# Patient Record
Sex: MALE | Race: OTHER
[De-identification: names, ages, dates, MRNs, and addresses within clinical notes are randomized per-mention and may not be internally consistent; named-entity substitution may affect disease eponyms.]

---

## 2020-06-15 ENCOUNTER — HOSPITAL ENCOUNTER (INPATIENT)
Dept: HOSPITAL 54 - ER | Age: 45
LOS: 8 days | Discharge: HOME | DRG: 133 | End: 2020-06-23
Attending: INTERNAL MEDICINE | Admitting: INTERNAL MEDICINE
Payer: COMMERCIAL

## 2020-06-15 VITALS — BODY MASS INDEX: 38.24 KG/M2 | HEIGHT: 64 IN | WEIGHT: 224 LBS

## 2020-06-15 DIAGNOSIS — E44.1: ICD-10-CM

## 2020-06-15 DIAGNOSIS — J15.9: ICD-10-CM

## 2020-06-15 DIAGNOSIS — J96.01: Primary | ICD-10-CM

## 2020-06-15 DIAGNOSIS — J12.89: ICD-10-CM

## 2020-06-15 DIAGNOSIS — R73.9: ICD-10-CM

## 2020-06-15 DIAGNOSIS — E86.1: ICD-10-CM

## 2020-06-15 DIAGNOSIS — E87.1: ICD-10-CM

## 2020-06-15 DIAGNOSIS — U07.1: ICD-10-CM

## 2020-06-15 LAB
BASOPHILS # BLD AUTO: 0 /CMM (ref 0–0.2)
BASOPHILS NFR BLD AUTO: 0.5 % (ref 0–2)
EOSINOPHIL NFR BLD AUTO: 2.2 % (ref 0–6)
HCT VFR BLD AUTO: 45 % (ref 39–51)
HGB BLD-MCNC: 15.1 G/DL (ref 13.5–17.5)
LYMPHOCYTES NFR BLD AUTO: 1 /CMM (ref 0.8–4.8)
LYMPHOCYTES NFR BLD AUTO: 22.7 % (ref 20–44)
MCHC RBC AUTO-ENTMCNC: 34 G/DL (ref 31–36)
MCV RBC AUTO: 89 FL (ref 80–96)
MONOCYTES NFR BLD AUTO: 0.5 /CMM (ref 0.1–1.3)
MONOCYTES NFR BLD AUTO: 11.9 % (ref 2–12)
NEUTROPHILS # BLD AUTO: 2.7 /CMM (ref 1.8–8.9)
NEUTROPHILS NFR BLD AUTO: 62.7 % (ref 43–81)
PLATELET # BLD AUTO: 221 /CMM (ref 150–450)
RBC # BLD AUTO: 5.05 MIL/UL (ref 4.5–6)
WBC NRBC COR # BLD AUTO: 4.3 K/UL (ref 4.3–11)

## 2020-06-15 PROCEDURE — G0378 HOSPITAL OBSERVATION PER HR: HCPCS

## 2020-06-15 NOTE — NUR
PT BIBS FOR C/O SOB ON EXERTION. PT APPARENTLY  HAD FEVER FOR 3 DAYS AND IT 
STOPPED 2 DAYS AGO. PT CONNECTED TO THE MONITOR AND POX. NO ACUTE DISTRESS 
NOTED. VSS.

## 2020-06-16 VITALS — DIASTOLIC BLOOD PRESSURE: 91 MMHG | SYSTOLIC BLOOD PRESSURE: 139 MMHG

## 2020-06-16 VITALS — DIASTOLIC BLOOD PRESSURE: 89 MMHG | SYSTOLIC BLOOD PRESSURE: 136 MMHG

## 2020-06-16 VITALS — DIASTOLIC BLOOD PRESSURE: 86 MMHG | SYSTOLIC BLOOD PRESSURE: 137 MMHG

## 2020-06-16 VITALS — DIASTOLIC BLOOD PRESSURE: 80 MMHG | SYSTOLIC BLOOD PRESSURE: 137 MMHG

## 2020-06-16 VITALS — DIASTOLIC BLOOD PRESSURE: 82 MMHG | SYSTOLIC BLOOD PRESSURE: 133 MMHG

## 2020-06-16 LAB
ALBUMIN SERPL BCP-MCNC: 3 G/DL (ref 3.4–5)
ALP SERPL-CCNC: 95 U/L (ref 46–116)
ALT SERPL W P-5'-P-CCNC: 55 U/L (ref 12–78)
AST SERPL W P-5'-P-CCNC: 50 U/L (ref 15–37)
BILIRUB SERPL-MCNC: 0.4 MG/DL (ref 0.2–1)
BUN SERPL-MCNC: 8 MG/DL (ref 7–18)
CALCIUM SERPL-MCNC: 8.6 MG/DL (ref 8.5–10.1)
CHLORIDE SERPL-SCNC: 100 MMOL/L (ref 98–107)
CO2 SERPL-SCNC: 29 MMOL/L (ref 21–32)
CREAT SERPL-MCNC: 1.1 MG/DL (ref 0.6–1.3)
GLUCOSE SERPL-MCNC: 290 MG/DL (ref 74–106)
NT-PROBNP SERPL-MCNC: 28 PG/ML (ref 0–125)
POTASSIUM SERPL-SCNC: 3.6 MMOL/L (ref 3.5–5.1)
PROT SERPL-MCNC: 8.1 G/DL (ref 6.4–8.2)
SODIUM SERPL-SCNC: 134 MMOL/L (ref 136–145)

## 2020-06-16 RX ADMIN — DEXTROSE MONOHYDRATE SCH MLS/HR: 50 INJECTION, SOLUTION INTRAVENOUS at 13:19

## 2020-06-16 NOTE — NUR
RN ADMITTING NOTE



RECEIVED PATIENT FROM ER VIA GURNEY; ADMITTING DIAGNOSIS OF PNEUMONIA R/O COVID19; RESULTS 
PENDING. PATIENT AWAKE, ALERT AND ORIENTED X4. Yoruba SPEAKING BUT ABLE TO UNDERSTAND 
ENGLISH. DENIES PAIN.  IN NO S/SX OF ACUTE DISTRESS AT THIS TIME. NO SOB NOTED. PATIENT IS 
ON ROOM AIR, BREATHING IS EVEN AND UNLABORED. PATIENT ON TELE MONITOR READING SR WITH HR AT 
80'S. NOTED IV SITE ON L ANTECUBITAL. WITH ONGOING AZITHROMYCIN 500 MG  MLS/HR; GAUGE 
20,FLUSHING AND PATENT , SALINE LOCKED. NO S/S OF INFECTION OR INFILTRATION. SAFETY MEASURES 
HAVE BEEN PROVIDED AND IMPLEMENTED. HEAD OF BED ELEVATED. BED IS LOCKED,  IN LOWEST POSITION 
AND SIDE RAILS UP. CALL LIGHT WITHIN REACH OF THE PATIENT. ISOLATION PRECAUTIONS IN PLACE. 
WILL CONTINUE TO MONITOR AND REASSESS FOR ANY CHANGES. WILL ATTEND TO ALL MD ADMITTING 
ORDERS.

## 2020-06-16 NOTE — NUR
rn notes



patient received on room air, no sob noted, patient shows no s/s of pain at this time.  bed 
at the lowest setting, call light within reach, side rails up x2.

## 2020-06-16 NOTE — NUR
rn notes



patient remains on room air, no sob noted, a.o x4 and denies pain at this time.  a,bulatory 
and has skin intact  R AC 20 and is working.  bed at the lowest setting, call light within 
reach, side rails up x2.

## 2020-06-16 NOTE — NUR
RN CLOSING NOTE



PATIENT REMAINS IN ROOM RESTING COMFORTABLY.NO SIGNS OF RESPIRATORY, ON RA;TOLERATTING WELL 
SATURATING @ 98% ON ROOM AIR.PATIENT IS CLEAN , DRY AND COMFORTABLE THROUGHOUT THE SHIFT. 
ALL DUE MEDS GIVEN AS ORDERED ; PATIENT TOLERATED WELL. SAFETY MEASURES IMPLEMENTED, BED IN 
LOWEST POSITION, LOCKED, SIDE RAILS UP, CALL LIGHT WITHIN REACH. ENDORSED TO ONCOMING SHIFT 
RN FOR CONTINUITY OF CARE.

## 2020-06-17 VITALS — DIASTOLIC BLOOD PRESSURE: 93 MMHG | SYSTOLIC BLOOD PRESSURE: 141 MMHG

## 2020-06-17 VITALS — DIASTOLIC BLOOD PRESSURE: 82 MMHG | SYSTOLIC BLOOD PRESSURE: 130 MMHG

## 2020-06-17 VITALS — SYSTOLIC BLOOD PRESSURE: 141 MMHG | DIASTOLIC BLOOD PRESSURE: 85 MMHG

## 2020-06-17 VITALS — DIASTOLIC BLOOD PRESSURE: 85 MMHG | SYSTOLIC BLOOD PRESSURE: 129 MMHG

## 2020-06-17 VITALS — DIASTOLIC BLOOD PRESSURE: 81 MMHG | SYSTOLIC BLOOD PRESSURE: 126 MMHG

## 2020-06-17 VITALS — DIASTOLIC BLOOD PRESSURE: 82 MMHG | SYSTOLIC BLOOD PRESSURE: 125 MMHG

## 2020-06-17 LAB
BASOPHILS # BLD AUTO: 0 /CMM (ref 0–0.2)
BASOPHILS NFR BLD AUTO: 0.3 % (ref 0–2)
BUN SERPL-MCNC: 12 MG/DL (ref 7–18)
CALCIUM SERPL-MCNC: 9.1 MG/DL (ref 8.5–10.1)
CHLORIDE SERPL-SCNC: 100 MMOL/L (ref 98–107)
CO2 SERPL-SCNC: 39 MMOL/L (ref 21–32)
CREAT SERPL-MCNC: 1.3 MG/DL (ref 0.6–1.3)
EOSINOPHIL NFR BLD AUTO: 3.7 % (ref 0–6)
GLUCOSE SERPL-MCNC: 314 MG/DL (ref 74–106)
HCT VFR BLD AUTO: 46 % (ref 39–51)
HGB BLD-MCNC: 15.4 G/DL (ref 13.5–17.5)
LYMPHOCYTES NFR BLD AUTO: 1.6 /CMM (ref 0.8–4.8)
LYMPHOCYTES NFR BLD AUTO: 26.5 % (ref 20–44)
MAGNESIUM SERPL-MCNC: 2.3 MG/DL (ref 1.8–2.4)
MCHC RBC AUTO-ENTMCNC: 33 G/DL (ref 31–36)
MCV RBC AUTO: 89 FL (ref 80–96)
MONOCYTES NFR BLD AUTO: 0.8 /CMM (ref 0.1–1.3)
MONOCYTES NFR BLD AUTO: 12.7 % (ref 2–12)
NEUTROPHILS # BLD AUTO: 3.4 /CMM (ref 1.8–8.9)
NEUTROPHILS NFR BLD AUTO: 56.8 % (ref 43–81)
PHOSPHATE SERPL-MCNC: 4 MG/DL (ref 2.5–4.9)
PLATELET # BLD AUTO: 259 /CMM (ref 150–450)
POTASSIUM SERPL-SCNC: 4.1 MMOL/L (ref 3.5–5.1)
RBC # BLD AUTO: 5.22 MIL/UL (ref 4.5–6)
SODIUM SERPL-SCNC: 137 MMOL/L (ref 136–145)
WBC NRBC COR # BLD AUTO: 6 K/UL (ref 4.3–11)

## 2020-06-17 RX ADMIN — DEXTROSE MONOHYDRATE SCH MLS/HR: 50 INJECTION, SOLUTION INTRAVENOUS at 00:37

## 2020-06-17 RX ADMIN — ACETAMINOPHEN PRN MG: 325 TABLET ORAL at 10:35

## 2020-06-17 NOTE — NUR
RN opening note:



Received patient in bed. Awake, alert and oriented x4. Isolation for COVID-19 in place. On 
cont. 02 via NC @ 2lpm being tolerated well with saturation of 94% noted, No SOB and not in 
respiratory distress. Tele monitor showing sinus rhythm @ 80bpm. Patient is ambulatory with 
minimal assistance with no SOB on exertion reported by previous shift. IV site clean, dry, 
patent and intact. No pain reported by patient. Call light in reach. Bed locked, low and at 
semi-sheppard's position. Side rails up x3. Safety ensured and observed. Will continue to 
monitor.

## 2020-06-17 NOTE — NUR
RN closing note:



No acute changes noted on shift. Patient remains in bed. Awake, alert and oriented x4. 
Isolation for COVID-19 in place. On cont. 02 via NC @ 2lpm being tolerated well with 
saturation of >92% noted, No SOB and not in respiratory distress. Tele monitor showing sinus 
rhythm @ 80bpm. Patient is ambulatory with minimal assistance with no SOB on exertion 
reported by previous shift. IV site clean, dry, patent and intact. No pain reported by 
patient. Call light in reach. Bed locked, low and at semi-sheppard's position. Side rails up 
x3. Safety ensured and observed. Due medications given. Treatment given as ordered. Will 
endorse to oncoming shift for TOO.

## 2020-06-17 NOTE — NUR
RN NOTES,



PATIENT SLEEPING AT THIS TIME NO SOB/DISTRESS NOTED,  ON 2LPM VIA NC, NO SIGNIFICANT CHANGE 
IN CONDITION DURING THE NIGHT, WILL ENDORSE CONTINUITY OF CARE TO ONCOMING NURSE.

## 2020-06-17 NOTE — NUR
RN TELE NOTES,



RECEIVED PATIENT FROM SHAVONNE HEMPHILL FOR CONTINUITY OF CARE, PATIENT IN STABLE CONDITION AT RA, 
NO SOB/ACUTE DISTRESS NOTED, WILL CONTINUE TO MONITOR CLOSELY.

## 2020-06-17 NOTE — NUR
RN OPENING NOTE



PT RECEIVED IN BED LAYING DOWN COMFORTABLE. PT IS A/A/O X4. THERE IS NO S/S OF DISTRESS. PT 
HAS UNLABORED BREATHING. PT IS ON 3 L VIA NC SATING 97%, TELE MONITOR SHOWING SR  WITH HR OF 
72. PT HAS IV ON R AC 20 G PATENT AND INTACT. SAFETY MEASURES IN PLACE BED AT LOWEST 
POSITION, LOCKED, SIDE RAILS UP X2, AND CALL LIGHT IN REACH. WILL CONTINUE TO MONITOR.

## 2020-06-18 VITALS — DIASTOLIC BLOOD PRESSURE: 76 MMHG | SYSTOLIC BLOOD PRESSURE: 128 MMHG

## 2020-06-18 VITALS — DIASTOLIC BLOOD PRESSURE: 78 MMHG | SYSTOLIC BLOOD PRESSURE: 130 MMHG

## 2020-06-18 VITALS — SYSTOLIC BLOOD PRESSURE: 131 MMHG | DIASTOLIC BLOOD PRESSURE: 80 MMHG

## 2020-06-18 VITALS — DIASTOLIC BLOOD PRESSURE: 91 MMHG | SYSTOLIC BLOOD PRESSURE: 142 MMHG

## 2020-06-18 VITALS — SYSTOLIC BLOOD PRESSURE: 112 MMHG | DIASTOLIC BLOOD PRESSURE: 71 MMHG

## 2020-06-18 VITALS — DIASTOLIC BLOOD PRESSURE: 82 MMHG | SYSTOLIC BLOOD PRESSURE: 118 MMHG

## 2020-06-18 LAB
BASOPHILS # BLD AUTO: 0 /CMM (ref 0–0.2)
BASOPHILS NFR BLD AUTO: 0.2 % (ref 0–2)
BUN SERPL-MCNC: 10 MG/DL (ref 7–18)
CALCIUM SERPL-MCNC: 8.9 MG/DL (ref 8.5–10.1)
CHLORIDE SERPL-SCNC: 99 MMOL/L (ref 98–107)
CO2 SERPL-SCNC: 30 MMOL/L (ref 21–32)
CREAT SERPL-MCNC: 0.9 MG/DL (ref 0.6–1.3)
EOSINOPHIL NFR BLD AUTO: 3.6 % (ref 0–6)
GLUCOSE SERPL-MCNC: 125 MG/DL (ref 74–106)
HCT VFR BLD AUTO: 46 % (ref 39–51)
HGB BLD-MCNC: 15.6 G/DL (ref 13.5–17.5)
LYMPHOCYTES NFR BLD AUTO: 1.4 /CMM (ref 0.8–4.8)
LYMPHOCYTES NFR BLD AUTO: 18.7 % (ref 20–44)
MCHC RBC AUTO-ENTMCNC: 34 G/DL (ref 31–36)
MCV RBC AUTO: 87 FL (ref 80–96)
MONOCYTES NFR BLD AUTO: 0.8 /CMM (ref 0.1–1.3)
MONOCYTES NFR BLD AUTO: 10.7 % (ref 2–12)
NEUTROPHILS # BLD AUTO: 5.2 /CMM (ref 1.8–8.9)
NEUTROPHILS NFR BLD AUTO: 66.8 % (ref 43–81)
PLATELET # BLD AUTO: 271 /CMM (ref 150–450)
POTASSIUM SERPL-SCNC: 4.2 MMOL/L (ref 3.5–5.1)
RBC # BLD AUTO: 5.27 MIL/UL (ref 4.5–6)
SODIUM SERPL-SCNC: 135 MMOL/L (ref 136–145)
WBC NRBC COR # BLD AUTO: 7.7 K/UL (ref 4.3–11)

## 2020-06-18 RX ADMIN — DEXTROSE MONOHYDRATE SCH MLS/HR: 50 INJECTION, SOLUTION INTRAVENOUS at 01:27

## 2020-06-18 RX ADMIN — DEXTROSE MONOHYDRATE SCH MLS/HR: 50 INJECTION, SOLUTION INTRAVENOUS at 00:45

## 2020-06-18 NOTE — NUR
RN NOTES 

VSS STABLE, PT TOBY ANY DISTRESS , NO CHANGES NOTED ON THIS SHIFT, WILL ENDORSE TO NIGHT 
SHIFT NURSE FOR CONTINUITY OF CARE

## 2020-06-18 NOTE — NUR
RN CLOSING NOTE



PT IS IN THE BED RESTING COMFORTABLY. NO S/S OF DISTRESS NOTED. PT IS ON 3 L VIA NC SATING 
ABOVE 97%. PT HAS UNLABORED BREATHING.NO ACUTE CHANGES  DURING MY SHIFT. VITAL SIGNS STABLE. 
WILL ENDORSE TO INCOMING SHIFT FOR CONTINUITY OF CARE.

## 2020-06-18 NOTE — NUR
RN NOTE

RECEIVED PT ON BED, A/Ox4. ON 3L O2 N/C , NO SOB NOTED, RESPIRATION EVEN AND UNLABORED, ON 
TELE SR -SR HR IN LOW 60'S , R AC IV SITE G 20 , CLEAN, DRY AND INTACT, SAFETY MEASURES IN 
PLACE ,BED AT LOWEST POSITION AND  LOCKED, SIDE RAILS UP X3,  CALL LIGHT WITHIN EASY REACH. 
WILL CONTINUE TO MONITOR.

## 2020-06-18 NOTE — NUR
RN OPENING NOTE



PT RECEIVED IN BED LAYING DOWN COMFORTABLE. PT IS A/A/O X4. THERE IS NO S/S OF DISTRESS. PT 
HAS UNLABORED BREATHING. PT IS ON 3 L VIA NC SATING 98%, TELE MONITOR SHOWING SR  WITH HR OF 
72. PT HAS IV ON R AC 20 G PATENT AND INTACT, SALINE LOCK. SAFETY MEASURES IN PLACE BED AT 
LOWEST POSITION, LOCKED, SIDE RAILS UP X2, AND CALL LIGHT IN REACH. WILL CONTINUE TO 
MONITOR.

## 2020-06-19 VITALS — DIASTOLIC BLOOD PRESSURE: 80 MMHG | SYSTOLIC BLOOD PRESSURE: 131 MMHG

## 2020-06-19 VITALS — DIASTOLIC BLOOD PRESSURE: 83 MMHG | SYSTOLIC BLOOD PRESSURE: 134 MMHG

## 2020-06-19 VITALS — SYSTOLIC BLOOD PRESSURE: 116 MMHG | DIASTOLIC BLOOD PRESSURE: 76 MMHG

## 2020-06-19 VITALS — SYSTOLIC BLOOD PRESSURE: 131 MMHG | DIASTOLIC BLOOD PRESSURE: 87 MMHG

## 2020-06-19 VITALS — DIASTOLIC BLOOD PRESSURE: 84 MMHG | SYSTOLIC BLOOD PRESSURE: 136 MMHG

## 2020-06-19 VITALS — SYSTOLIC BLOOD PRESSURE: 128 MMHG | DIASTOLIC BLOOD PRESSURE: 79 MMHG

## 2020-06-19 LAB
BASE EXCESS BLDA CALC-SCNC: 0.9 MMOL/L
BASOPHILS # BLD AUTO: 0 /CMM (ref 0–0.2)
BASOPHILS NFR BLD AUTO: 0.5 % (ref 0–2)
BUN SERPL-MCNC: 13 MG/DL (ref 7–18)
CALCIUM SERPL-MCNC: 8.7 MG/DL (ref 8.5–10.1)
CHLORIDE SERPL-SCNC: 99 MMOL/L (ref 98–107)
CO2 SERPL-SCNC: 31 MMOL/L (ref 21–32)
CREAT SERPL-MCNC: 0.9 MG/DL (ref 0.6–1.3)
DO-HGB MFR BLDA: 91.1 MMHG
EOSINOPHIL NFR BLD AUTO: 2.9 % (ref 0–6)
GLUCOSE SERPL-MCNC: 142 MG/DL (ref 74–106)
HCT VFR BLD AUTO: 45 % (ref 39–51)
HGB BLD-MCNC: 15.3 G/DL (ref 13.5–17.5)
INHALED O2 CONCENTRATION: 32 %
LYMPHOCYTES NFR BLD AUTO: 1.5 /CMM (ref 0.8–4.8)
LYMPHOCYTES NFR BLD AUTO: 16.5 % (ref 20–44)
MCHC RBC AUTO-ENTMCNC: 34 G/DL (ref 31–36)
MCV RBC AUTO: 87 FL (ref 80–96)
MONOCYTES NFR BLD AUTO: 0.8 /CMM (ref 0.1–1.3)
MONOCYTES NFR BLD AUTO: 9.2 % (ref 2–12)
NEUTROPHILS # BLD AUTO: 6.4 /CMM (ref 1.8–8.9)
NEUTROPHILS NFR BLD AUTO: 70.9 % (ref 43–81)
PCO2 TEMP ADJ BLDA: 40.3 MMHG (ref 35–45)
PH TEMP ADJ BLDA: 7.42 [PH] (ref 7.35–7.45)
PLATELET # BLD AUTO: 286 /CMM (ref 150–450)
PO2 TEMP ADJ BLDA: 89.9 MMHG (ref 75–100)
POTASSIUM SERPL-SCNC: 4 MMOL/L (ref 3.5–5.1)
RBC # BLD AUTO: 5.18 MIL/UL (ref 4.5–6)
SAO2 % BLDA: 97.2 % (ref 92–98.5)
SODIUM SERPL-SCNC: 136 MMOL/L (ref 136–145)
WBC NRBC COR # BLD AUTO: 9 K/UL (ref 4.3–11)

## 2020-06-19 RX ADMIN — DEXTROSE MONOHYDRATE SCH MLS/HR: 50 INJECTION, SOLUTION INTRAVENOUS at 01:12

## 2020-06-19 RX ADMIN — DEXTROSE MONOHYDRATE SCH MLS/HR: 50 INJECTION, SOLUTION INTRAVENOUS at 00:48

## 2020-06-19 RX ADMIN — ACETAMINOPHEN PRN MG: 325 TABLET ORAL at 06:42

## 2020-06-19 RX ADMIN — ENOXAPARIN SODIUM SCH MG: 40 INJECTION SUBCUTANEOUS at 10:45

## 2020-06-19 RX ADMIN — DEXAMETHASONE SODIUM PHOSPHATE SCH MG: 10 INJECTION INTRAMUSCULAR; INTRAVENOUS at 15:30

## 2020-06-19 RX ADMIN — AZITHROMYCIN DIHYDRATE SCH MG: 250 TABLET, FILM COATED ORAL at 21:23

## 2020-06-19 NOTE — NUR
RN CLOSING NOTE



PT IS IN THE BED RESTING COMFORTABLY. NO S/S OF DISTRESS NOTED. PT IS ON 3 L VIA NC SATING 
ABOVE 98%. PT HAS UNLABORED BREATHING.NO ACUTE CHANGES  DURING MY SHIFT. VITAL SIGNS STABLE. 
WILL ENDORSE TO INCOMING SHIFT FOR CONTINUITY OF CARE.

## 2020-06-19 NOTE — NUR
MARY RN OPENING NOTE



 PT IS AWAKE IN BED, ALERT AND ORIENTED X 4, ON 02 VIA NC 2L/MIN, SATURATING WELL, 
RESPIRATIONS EVEN AND UNLABORED, NO SIGNS OF RESPIRATORY DISTRESS NOTED. SINUS RHYTHM ON 
TELE MONITOR. IV SITE ON RIGHT AC G20 INTACT, PATENTFLUSHED WELL AND INTACT. WITH SALINE 
LOCK IN PLACE. BED IN LOW POSITION, RAILS UP X2 .LOCKED, CALL LIGHT WITHIN REACH.WILL 
CONTINUE TO MONITOR.

## 2020-06-19 NOTE — NUR
RN OPENING NOTES:



PATIENT IN BED, AWAKE, AND ABLE TO MAKE NEEDS KNOWN. AAOX4. NO RESPIRATORY DISTRESS. ON O2 
AT 3LPM VIA NC, TOLERATING WELL. NO C/O PAIN. (R) AC G20 C/D/I. FLUSHING WELL. PER AM CHARGE 
NURSE, PATIENT REFUSED TO BE ON TELE MONITOR. OFFERED TO PATIENT 3X, RISKS AND BENEFITS, 
STILL REFUSED. WILL TRY AGAIN LATER. ON DROPLET/CONTACT PRECAUTION FOR COVID+. CALL LIGHT 
WITHIN REACH. WILL CONT. TO MONITOR.

## 2020-06-19 NOTE — NUR
MARY RN CLOSING NOTE



 PT IS AWAKE IS RESTING IN BED, ALERT AND ORIENTED X 4, ON 02 VIA NC 2L/MIN, SATURATING 
WELL, RESPIRATIONS EVEN AND UNLABORED, NO SIGNS OF RESPIRATORY DISTRESS NOTED. SINUS RHYTHM 
ON TELE MONITOR HR 77. VS WNL. IV SITE ON RIGHT AC G20 INTACT, FLUSHED WELL AND INTACT. WITH 
SALINE LOCK IN PLACE. BED IN LOW POSITION, RAILS UP X2 .LOCKED, CALL LIGHT WITHIN REACH.WILL 
ENDORSED TO NIGHTSHIFTS TO TOO

## 2020-06-19 NOTE — NUR
OPENING NOTE



RECEIVED REPORT FROM NOC SHIFT NURSE. PT AWAKE IN BED, ALERT AND ORIENTED X 4, ON 02 VIA NC 
2L/MIN, SATURATING WELL, RESPIRATIONS EVEN AND UNLABORED, NO SIGNS OF RESPIRATORY DISTRESS 
NOTED. SINUS RHYTHM ON TELE MONITOR. IV SITE ON RIGHT AC G20 INTACT, PATENT, WITH SALINE 
LOCK IN PLACE. BED IN LOW POSITION, LOCKED, CALL LIGHT WITHIN REACH.

## 2020-06-20 VITALS — SYSTOLIC BLOOD PRESSURE: 130 MMHG | DIASTOLIC BLOOD PRESSURE: 76 MMHG

## 2020-06-20 VITALS — DIASTOLIC BLOOD PRESSURE: 84 MMHG | SYSTOLIC BLOOD PRESSURE: 136 MMHG

## 2020-06-20 VITALS — DIASTOLIC BLOOD PRESSURE: 83 MMHG | SYSTOLIC BLOOD PRESSURE: 139 MMHG

## 2020-06-20 VITALS — DIASTOLIC BLOOD PRESSURE: 82 MMHG | SYSTOLIC BLOOD PRESSURE: 128 MMHG

## 2020-06-20 VITALS — SYSTOLIC BLOOD PRESSURE: 133 MMHG | DIASTOLIC BLOOD PRESSURE: 80 MMHG

## 2020-06-20 VITALS — SYSTOLIC BLOOD PRESSURE: 146 MMHG | DIASTOLIC BLOOD PRESSURE: 98 MMHG

## 2020-06-20 LAB
BASOPHILS # BLD AUTO: 0 /CMM (ref 0–0.2)
BASOPHILS NFR BLD AUTO: 0.2 % (ref 0–2)
BUN SERPL-MCNC: 11 MG/DL (ref 7–18)
CALCIUM SERPL-MCNC: 9 MG/DL (ref 8.5–10.1)
CHLORIDE SERPL-SCNC: 98 MMOL/L (ref 98–107)
CO2 SERPL-SCNC: 28 MMOL/L (ref 21–32)
CREAT SERPL-MCNC: 0.8 MG/DL (ref 0.6–1.3)
EOSINOPHIL NFR BLD AUTO: 0.1 % (ref 0–6)
GLUCOSE SERPL-MCNC: 217 MG/DL (ref 74–106)
HCT VFR BLD AUTO: 45 % (ref 39–51)
HGB BLD-MCNC: 15 G/DL (ref 13.5–17.5)
LYMPHOCYTES NFR BLD AUTO: 1.2 /CMM (ref 0.8–4.8)
LYMPHOCYTES NFR BLD AUTO: 8.8 % (ref 20–44)
MCHC RBC AUTO-ENTMCNC: 34 G/DL (ref 31–36)
MCV RBC AUTO: 88 FL (ref 80–96)
MONOCYTES NFR BLD AUTO: 0.7 /CMM (ref 0.1–1.3)
MONOCYTES NFR BLD AUTO: 5.4 % (ref 2–12)
NEUTROPHILS # BLD AUTO: 11.4 /CMM (ref 1.8–8.9)
NEUTROPHILS NFR BLD AUTO: 85.5 % (ref 43–81)
PLATELET # BLD AUTO: 331 /CMM (ref 150–450)
POTASSIUM SERPL-SCNC: 4.1 MMOL/L (ref 3.5–5.1)
RBC # BLD AUTO: 5.09 MIL/UL (ref 4.5–6)
SODIUM SERPL-SCNC: 134 MMOL/L (ref 136–145)
WBC NRBC COR # BLD AUTO: 13.3 K/UL (ref 4.3–11)

## 2020-06-20 RX ADMIN — AZITHROMYCIN DIHYDRATE SCH MG: 250 TABLET, FILM COATED ORAL at 20:23

## 2020-06-20 RX ADMIN — DEXTROSE MONOHYDRATE SCH MLS/HR: 50 INJECTION, SOLUTION INTRAVENOUS at 00:49

## 2020-06-20 RX ADMIN — ENOXAPARIN SODIUM SCH MG: 40 INJECTION SUBCUTANEOUS at 09:28

## 2020-06-20 RX ADMIN — DEXAMETHASONE SODIUM PHOSPHATE SCH MG: 10 INJECTION INTRAMUSCULAR; INTRAVENOUS at 09:25

## 2020-06-20 RX ADMIN — DEXTROSE MONOHYDRATE SCH MLS/HR: 50 INJECTION, SOLUTION INTRAVENOUS at 23:48

## 2020-06-20 NOTE — NUR
RN CLOSING NOTES



PT. IN BED. NO ACUTE DISTRESS NOTED. PT. A&OX4. PT. ON 3L O2 VIA NC, SATURATING WELL AT 99%. 
PT. ON TELE MONITOR, SR NOTED. PT. IV ACCESS R AC #20, INTACT, PATENT, FLUSHED WELL. PT. 
SAFETY MAINTAINED. CALL LIGHT WITHIN REACH. WILL ENDORSE PLAN OF CARE TO ONCOMING NURSE

## 2020-06-20 NOTE — NUR
RN CLOSING NOTES:



PATIENT IN STABLE CONDITION. ENDORSED TO AM SHIFT NURSE FOR CONTINUITY OF CARE.

## 2020-06-21 VITALS — DIASTOLIC BLOOD PRESSURE: 88 MMHG | SYSTOLIC BLOOD PRESSURE: 143 MMHG

## 2020-06-21 VITALS — SYSTOLIC BLOOD PRESSURE: 115 MMHG | DIASTOLIC BLOOD PRESSURE: 62 MMHG

## 2020-06-21 VITALS — DIASTOLIC BLOOD PRESSURE: 79 MMHG | SYSTOLIC BLOOD PRESSURE: 145 MMHG

## 2020-06-21 VITALS — DIASTOLIC BLOOD PRESSURE: 73 MMHG | SYSTOLIC BLOOD PRESSURE: 138 MMHG

## 2020-06-21 VITALS — DIASTOLIC BLOOD PRESSURE: 80 MMHG | SYSTOLIC BLOOD PRESSURE: 116 MMHG

## 2020-06-21 VITALS — SYSTOLIC BLOOD PRESSURE: 136 MMHG | DIASTOLIC BLOOD PRESSURE: 76 MMHG

## 2020-06-21 LAB
BASOPHILS # BLD AUTO: 0 /CMM (ref 0–0.2)
BASOPHILS NFR BLD AUTO: 0.3 % (ref 0–2)
BUN SERPL-MCNC: 12 MG/DL (ref 7–18)
CALCIUM SERPL-MCNC: 8.7 MG/DL (ref 8.5–10.1)
CHLORIDE SERPL-SCNC: 102 MMOL/L (ref 98–107)
CO2 SERPL-SCNC: 29 MMOL/L (ref 21–32)
CREAT SERPL-MCNC: 0.9 MG/DL (ref 0.6–1.3)
EOSINOPHIL NFR BLD AUTO: 0.2 % (ref 0–6)
GLUCOSE SERPL-MCNC: 206 MG/DL (ref 74–106)
HCT VFR BLD AUTO: 43 % (ref 39–51)
HGB BLD-MCNC: 14.2 G/DL (ref 13.5–17.5)
LYMPHOCYTES NFR BLD AUTO: 1.8 /CMM (ref 0.8–4.8)
LYMPHOCYTES NFR BLD AUTO: 11 % (ref 20–44)
MCHC RBC AUTO-ENTMCNC: 33 G/DL (ref 31–36)
MCV RBC AUTO: 89 FL (ref 80–96)
MONOCYTES NFR BLD AUTO: 1.1 /CMM (ref 0.1–1.3)
MONOCYTES NFR BLD AUTO: 7.2 % (ref 2–12)
NEUTROPHILS # BLD AUTO: 13 /CMM (ref 1.8–8.9)
NEUTROPHILS NFR BLD AUTO: 81.3 % (ref 43–81)
PLATELET # BLD AUTO: 345 /CMM (ref 150–450)
POTASSIUM SERPL-SCNC: 3.8 MMOL/L (ref 3.5–5.1)
RBC # BLD AUTO: 4.85 MIL/UL (ref 4.5–6)
SODIUM SERPL-SCNC: 138 MMOL/L (ref 136–145)
WBC NRBC COR # BLD AUTO: 16 K/UL (ref 4.3–11)

## 2020-06-21 RX ADMIN — AZITHROMYCIN DIHYDRATE SCH MG: 250 TABLET, FILM COATED ORAL at 20:47

## 2020-06-21 RX ADMIN — ENOXAPARIN SODIUM SCH MG: 40 INJECTION SUBCUTANEOUS at 08:27

## 2020-06-21 RX ADMIN — DEXAMETHASONE SODIUM PHOSPHATE SCH MG: 10 INJECTION INTRAMUSCULAR; INTRAVENOUS at 08:28

## 2020-06-21 NOTE — NUR
RN CLOSING NOTES:



PATIENT IN BED, ASLEEP, EASILY AROUSABLE. TOLERATED O2 AT 2LPM VIA NC, DENIES DIFFICULTY 
BREATHING. NO C/O PAIN. ENDORSE TO ONCOMING RN SHIFT FOR CONTINUITY OF CARE.

## 2020-06-21 NOTE — NUR
RN OPENING NOTE: RECEIVED PATIENT IN BED THIS MORNING. PATIENT IS ALERT AND ORIENTED X4, 
RESPONDS APPROPRIATELY. ON O2 2L/MIN VIA NC, SATING WELL, NO SIGNS OF ACUTE RESPIRATORY 
DISTRESS NOTED. NO SIGNS OF ACUTE DISTRESS NOTED. SR ON TELE MONITOR IN THE 60S. #20 RAC, 
C/D/I, FLUSHING WELL, NO SIGNS OF COMPLICATIONS NOTED. SAFETY MEASURES IMPLEMENTED, BED IN 
LOWEST POSITION, LOCKED, SIDE RAILS UP, CALL LIGHT WITHIN REACH. WILL CONTINUE TO MONITOR 
PATIENT FOR CHANGES.

## 2020-06-21 NOTE — NUR
RN CLOSING NOTE: PATIENT REMAINS IN BED. NO SIGNS OF ACUTE DISTRESS NOTED. SAFETY MEASURES 
IMPLEMENTED, BED IN LOWEST POSITION, LOCKED, SIDE RAILS UP, CALL LIGHT WITHIN REACH. 
ENDORSED TO JONATAN MENG FOR CONTINUITY OF CARE.

## 2020-06-21 NOTE — NUR
RN OPENING NOTES:



RECEIVED PT A/OX 4 IN BED RESTING COMFORTABLY. PATIENT IN NO S/SX OF ACUTE DISTRESS AT THIS 
TIME. NO SOB NOTED. PATIENT'S BREATHING IS EVEN AND UNLABORED. PATIENT IS ON 2 L OF OXYGEN 
VIA NC; TOLERATING WELL.   PATIENT ON TELE MONITORING READING SINUS RHYTHM HR 80S. NOTED IV 
SITE ON R AC ; PATENT IN INTACT,NO S/S OF INFECTION OR INFILTRATION.  SAFETY MEASURES HAVE 
BEEN PROVIDED AND IMPLEMENTED. PATIENT BED ALARM IS ON. HEAD OF BED ELEVATED. BED IS LOCKED, 
 IN LOWEST POSITION AND SIDE RAILS UP. CALL LIGHT WITHIN REACH OF THE PATIENT. ISOLATION 
PRECAUTIONS IN PLACE. WILL CONTINUE TO MONITOR AND REASSESS FOR ANY CHANGES.

## 2020-06-22 VITALS — SYSTOLIC BLOOD PRESSURE: 118 MMHG | DIASTOLIC BLOOD PRESSURE: 68 MMHG

## 2020-06-22 VITALS — DIASTOLIC BLOOD PRESSURE: 75 MMHG | SYSTOLIC BLOOD PRESSURE: 124 MMHG

## 2020-06-22 VITALS — DIASTOLIC BLOOD PRESSURE: 74 MMHG | SYSTOLIC BLOOD PRESSURE: 123 MMHG

## 2020-06-22 VITALS — SYSTOLIC BLOOD PRESSURE: 152 MMHG | DIASTOLIC BLOOD PRESSURE: 85 MMHG

## 2020-06-22 VITALS — SYSTOLIC BLOOD PRESSURE: 130 MMHG | DIASTOLIC BLOOD PRESSURE: 76 MMHG

## 2020-06-22 VITALS — SYSTOLIC BLOOD PRESSURE: 133 MMHG | DIASTOLIC BLOOD PRESSURE: 72 MMHG

## 2020-06-22 LAB
BASE EXCESS BLDA CALC-SCNC: 1.6 MMOL/L
BASOPHILS # BLD AUTO: 0 /CMM (ref 0–0.2)
BASOPHILS NFR BLD AUTO: 0.3 % (ref 0–2)
BUN SERPL-MCNC: 12 MG/DL (ref 7–18)
CALCIUM SERPL-MCNC: 8.6 MG/DL (ref 8.5–10.1)
CHLORIDE SERPL-SCNC: 99 MMOL/L (ref 98–107)
CO2 SERPL-SCNC: 27 MMOL/L (ref 21–32)
CREAT SERPL-MCNC: 1 MG/DL (ref 0.6–1.3)
DO-HGB MFR BLDA: 58.9 MMHG
EOSINOPHIL NFR BLD AUTO: 0.1 % (ref 0–6)
GLUCOSE SERPL-MCNC: 278 MG/DL (ref 74–106)
HCT VFR BLD AUTO: 44 % (ref 39–51)
HGB BLD-MCNC: 14.6 G/DL (ref 13.5–17.5)
INHALED O2 CONCENTRATION: 28 %
LYMPHOCYTES NFR BLD AUTO: 1.8 /CMM (ref 0.8–4.8)
LYMPHOCYTES NFR BLD AUTO: 10.8 % (ref 20–44)
MCHC RBC AUTO-ENTMCNC: 33 G/DL (ref 31–36)
MCV RBC AUTO: 88 FL (ref 80–96)
MONOCYTES NFR BLD AUTO: 1.3 /CMM (ref 0.1–1.3)
MONOCYTES NFR BLD AUTO: 7.9 % (ref 2–12)
NEUTROPHILS # BLD AUTO: 13.2 /CMM (ref 1.8–8.9)
NEUTROPHILS NFR BLD AUTO: 80.9 % (ref 43–81)
PCO2 TEMP ADJ BLDA: 37.9 MMHG (ref 35–45)
PH TEMP ADJ BLDA: 7.45 [PH] (ref 7.35–7.45)
PLATELET # BLD AUTO: 376 /CMM (ref 150–450)
PO2 TEMP ADJ BLDA: 96 MMHG (ref 75–100)
POTASSIUM SERPL-SCNC: 3.6 MMOL/L (ref 3.5–5.1)
RBC # BLD AUTO: 4.99 MIL/UL (ref 4.5–6)
SAO2 % BLDA: 97.6 % (ref 92–98.5)
SODIUM SERPL-SCNC: 135 MMOL/L (ref 136–145)
VENTILATION MODE VENT: (no result)
WBC NRBC COR # BLD AUTO: 16.3 K/UL (ref 4.3–11)

## 2020-06-22 RX ADMIN — DEXTROSE MONOHYDRATE SCH MLS/HR: 50 INJECTION, SOLUTION INTRAVENOUS at 00:06

## 2020-06-22 RX ADMIN — ENOXAPARIN SODIUM SCH MG: 40 INJECTION SUBCUTANEOUS at 08:58

## 2020-06-22 RX ADMIN — DEXAMETHASONE SODIUM PHOSPHATE SCH MG: 10 INJECTION INTRAMUSCULAR; INTRAVENOUS at 08:57

## 2020-06-22 RX ADMIN — AZITHROMYCIN DIHYDRATE SCH MG: 250 TABLET, FILM COATED ORAL at 22:02

## 2020-06-22 NOTE — NUR
RN CLOSING NOTE: PATIENT REMAINS IN BED. NO SIGNS OF RESPIRATORY DISTRESS NOTED. NO SIGNS OF 
ACUTE DISTRESS NOTED. PATIENT IS SR IN THE 90S ON TELE MONITOR. SAFETY MEASURES IMPLEMENTED, 
BED IN LOWEST POSITION, LOCKED, SIDE RAILS UP, CALL LIGHT WITHIN REACH. WILL ENDORSE TO 
JONATAN SHIPLEY FOR CONTINUITY OF CARE.

## 2020-06-22 NOTE — NUR
RN CLOSING NOTE: 



PATIENT REMAINS IN ROOM. NO SIGNS OF RESPIRATORY. ALL DUE MEDICATIONS GIVEN AS SCHEDULED & 
ORDERED. SAFETY MEASURES IMPLEMENTED, BED IN LOWEST POSITION, LOCKED, SIDE RAILS UP, CALL 
LIGHT WITHIN REACH. ENDORSED TO INCOMING SHIFT RN FOR CONTINUITY OF CARE.

## 2020-06-22 NOTE — NUR
RN OPENING NOTE:



 RECEIVED PATIENT IN BED RESTING. PT IS A/A/OX4. THERE IS NO S/S OF DISTRESS. PT SATING 96% 
ON RA. PT HAS UNLABORED BREATHING.TELE MONITOR SHOWING  SR IN THE 80S . IV 20 G ON LEFT 
HAND, S/L, FLUSHING WELL,PATENT, DRESSING INTACT.  SAFETY MEASURES IMPLEMENTED, BED IN 
LOWEST POSITION, LOCKED, SIDE RAILS UPX2, CALL LIGHT WITHIN REACH. WILL CONTINUE TO MONITOR 
PATIENT FOR CHANGES.

## 2020-06-22 NOTE — NUR
RN OPENING NOTE: RECEIVED PATIENT IN BED THIS MORNING. PATIENT IS ALERT AND ORIENTED X4, 
RESPONDS APPROPRIATELY. ON O2 2L/MIN VIA NC, SATING WELL, NO SIGNS OF RESPIRATORY DISTRESS 
NOTED. NO SIGNS OF ACUTE DISTRESS NOTED. SR IN THE 80S ON THE TELE MONITOR. AMBULATORY, 
MOTIVATED TO SELF CARE. #20 ON LEFT HAND, FLUSHING WELL, C/D/I, NO SIGNS OF COMPLICATIONS 
NOTED. SAFETY MEASURES IMPLEMENTED, BED IN LOWEST POSITION, LOCKED, SIDE RAILS UP, CALL 
LIGHT WITHIN REACH. WILL CONTINUE TO MONITOR PATIENT FOR CHANGES.

## 2020-06-23 VITALS — DIASTOLIC BLOOD PRESSURE: 72 MMHG | SYSTOLIC BLOOD PRESSURE: 128 MMHG

## 2020-06-23 VITALS — DIASTOLIC BLOOD PRESSURE: 77 MMHG | SYSTOLIC BLOOD PRESSURE: 139 MMHG

## 2020-06-23 VITALS — SYSTOLIC BLOOD PRESSURE: 133 MMHG | DIASTOLIC BLOOD PRESSURE: 81 MMHG

## 2020-06-23 VITALS — SYSTOLIC BLOOD PRESSURE: 128 MMHG | DIASTOLIC BLOOD PRESSURE: 72 MMHG

## 2020-06-23 LAB
BASOPHILS # BLD AUTO: 0 /CMM (ref 0–0.2)
BASOPHILS NFR BLD AUTO: 0.1 % (ref 0–2)
BUN SERPL-MCNC: 13 MG/DL (ref 7–18)
CALCIUM SERPL-MCNC: 8.6 MG/DL (ref 8.5–10.1)
CHLORIDE SERPL-SCNC: 99 MMOL/L (ref 98–107)
CO2 SERPL-SCNC: 28 MMOL/L (ref 21–32)
CREAT SERPL-MCNC: 1.1 MG/DL (ref 0.6–1.3)
EOSINOPHIL NFR BLD AUTO: 0.1 % (ref 0–6)
GLUCOSE SERPL-MCNC: 203 MG/DL (ref 74–106)
HCT VFR BLD AUTO: 44 % (ref 39–51)
HGB BLD-MCNC: 14.8 G/DL (ref 13.5–17.5)
LYMPHOCYTES NFR BLD AUTO: 12.2 % (ref 20–44)
LYMPHOCYTES NFR BLD AUTO: 2 /CMM (ref 0.8–4.8)
LYMPHOCYTES NFR BLD MANUAL: 13 % (ref 16–48)
MCHC RBC AUTO-ENTMCNC: 34 G/DL (ref 31–36)
MCV RBC AUTO: 88 FL (ref 80–96)
MONOCYTES NFR BLD AUTO: 1.3 /CMM (ref 0.1–1.3)
MONOCYTES NFR BLD AUTO: 8.1 % (ref 2–12)
MONOCYTES NFR BLD MANUAL: 2 % (ref 0–11)
MYELOCYTES NFR BLD MANUAL: 1 % (ref 0–0)
NEUTROPHILS # BLD AUTO: 13.1 /CMM (ref 1.8–8.9)
NEUTROPHILS NFR BLD AUTO: 79.5 % (ref 43–81)
NEUTS SEG NFR BLD MANUAL: 84 % (ref 42–76)
PLATELET # BLD AUTO: 368 /CMM (ref 150–450)
POTASSIUM SERPL-SCNC: 3.5 MMOL/L (ref 3.5–5.1)
RBC # BLD AUTO: 4.96 MIL/UL (ref 4.5–6)
SODIUM SERPL-SCNC: 136 MMOL/L (ref 136–145)
WBC NRBC COR # BLD AUTO: 16.5 K/UL (ref 4.3–11)

## 2020-06-23 RX ADMIN — DEXTROSE MONOHYDRATE SCH MLS/HR: 50 INJECTION, SOLUTION INTRAVENOUS at 01:11

## 2020-06-23 RX ADMIN — DEXAMETHASONE SODIUM PHOSPHATE SCH MG: 10 INJECTION INTRAMUSCULAR; INTRAVENOUS at 10:01

## 2020-06-23 RX ADMIN — ENOXAPARIN SODIUM SCH MG: 40 INJECTION SUBCUTANEOUS at 10:02

## 2020-06-23 NOTE — NUR
RN DC Note

Received DC order - patient to home. A/Ox4, on room air, ambulatory, no SOB, SPO2 98%. IV 
removed, cath intact, no bleeding. Belongings checklist signed by patient. DC education 
complete. No wound care pictures.